# Patient Record
Sex: MALE | Race: OTHER | NOT HISPANIC OR LATINO
[De-identification: names, ages, dates, MRNs, and addresses within clinical notes are randomized per-mention and may not be internally consistent; named-entity substitution may affect disease eponyms.]

---

## 2021-08-20 ENCOUNTER — APPOINTMENT (OUTPATIENT)
Dept: DERMATOLOGY | Facility: CLINIC | Age: 51
End: 2021-08-20

## 2021-09-14 ENCOUNTER — APPOINTMENT (OUTPATIENT)
Dept: PLASTIC SURGERY | Facility: CLINIC | Age: 51
End: 2021-09-14
Payer: COMMERCIAL

## 2021-09-14 PROCEDURE — 99203 OFFICE O/P NEW LOW 30 MIN: CPT

## 2021-09-14 NOTE — HISTORY OF PRESENT ILLNESS
[FreeTextEntry1] : 51 years old patient present in the office for\par Problem:  mass\par Location:  right clavicle area\par Duration:  about 6-7 years\par Seen by Dermatology:  yes\par No previous treatments\par Any itching: no          bleeding: no                Drainage: no\par Imaging/Biopsy: \par increasing in size, no change in color \par Infection or inflammation: no\par Pain or discomfort: no\par Personal history of skin cancer: no\par Family history of skin cancer: yes, mother.\par

## 2021-09-30 ENCOUNTER — APPOINTMENT (OUTPATIENT)
Dept: DERMATOLOGY | Facility: CLINIC | Age: 51
End: 2021-09-30

## 2021-10-05 ENCOUNTER — LABORATORY RESULT (OUTPATIENT)
Age: 51
End: 2021-10-05

## 2021-10-05 ENCOUNTER — APPOINTMENT (OUTPATIENT)
Dept: PLASTIC SURGERY | Facility: CLINIC | Age: 51
End: 2021-10-05
Payer: COMMERCIAL

## 2021-10-05 PROCEDURE — 13100 CMPLX RPR TRUNK 1.1-2.5 CM: CPT | Mod: 58,59

## 2021-10-05 PROCEDURE — 21555 EXC NECK LES SC < 3 CM: CPT

## 2021-10-05 NOTE — PROCEDURE
[FreeTextEntry6] : Preopdx: right clavicular mass\par Procedure: excisional biopsy  2.1 cm, and complex closure 2.1 cm right  chest\par Anesthesia: local 1% w/epi\par Specimens: to path on formalin\par No complications\par \par Summary:\par IC obtained.  Lesion demarcated with marking pen.  1%lido with epinephrine injected.  15 blade used to incise full thickness.    2.1Cm  lesion excised in subcutaneous plane.   Hemostasis obtained with cautery.  Skin edges widely undermined and closed for a complex closure of  2.1 cm.  bacitracin and steristrips placed.  \par \par

## 2021-10-14 ENCOUNTER — APPOINTMENT (OUTPATIENT)
Dept: PLASTIC SURGERY | Facility: CLINIC | Age: 51
End: 2021-10-14
Payer: COMMERCIAL

## 2021-10-14 PROCEDURE — 99024 POSTOP FOLLOW-UP VISIT: CPT

## 2021-10-14 NOTE — HISTORY OF PRESENT ILLNESS
[FreeTextEntry1] : DOP - 10/5/21\par S/P - Excision of Right clavicular area mass. \par path - Follicular cyst\par  No excessive bleeding. No fevers. No odor. No purulent discharge. No excessive pain.\par

## 2021-11-12 ENCOUNTER — APPOINTMENT (OUTPATIENT)
Dept: PLASTIC SURGERY | Facility: CLINIC | Age: 51
End: 2021-11-12
Payer: COMMERCIAL

## 2021-11-12 DIAGNOSIS — L72.0 EPIDERMAL CYST: ICD-10-CM

## 2021-11-12 PROCEDURE — 99024 POSTOP FOLLOW-UP VISIT: CPT

## 2021-11-12 NOTE — HISTORY OF PRESENT ILLNESS
[FreeTextEntry1] : Status post excision and biopsy of follicular cyst of right clavicular area October 5, 2021\par site is well-healed and there is a small pink scar\par no complaints

## 2023-04-28 ENCOUNTER — APPOINTMENT (OUTPATIENT)
Dept: ORTHOPEDIC SURGERY | Facility: CLINIC | Age: 53
End: 2023-04-28
Payer: COMMERCIAL

## 2023-04-28 VITALS
BODY MASS INDEX: 26.28 KG/M2 | TEMPERATURE: 97.2 F | DIASTOLIC BLOOD PRESSURE: 80 MMHG | SYSTOLIC BLOOD PRESSURE: 124 MMHG | WEIGHT: 194 LBS | OXYGEN SATURATION: 94 % | HEIGHT: 72 IN | HEART RATE: 69 BPM

## 2023-04-28 DIAGNOSIS — M79.661 PAIN IN RIGHT LOWER LEG: ICD-10-CM

## 2023-04-28 DIAGNOSIS — S83.271A COMPLEX TEAR OF LATERAL MENISCUS, CURRENT INJURY, RIGHT KNEE, INITIAL ENCOUNTER: ICD-10-CM

## 2023-04-28 PROCEDURE — 99204 OFFICE O/P NEW MOD 45 MIN: CPT

## 2023-04-28 PROCEDURE — 73562 X-RAY EXAM OF KNEE 3: CPT | Mod: 50

## 2023-05-07 ENCOUNTER — OUTPATIENT (OUTPATIENT)
Dept: OUTPATIENT SERVICES | Facility: HOSPITAL | Age: 53
LOS: 1 days | End: 2023-05-07
Payer: COMMERCIAL

## 2023-05-07 ENCOUNTER — EMERGENCY (EMERGENCY)
Facility: HOSPITAL | Age: 53
LOS: 1 days | Discharge: HOME | End: 2023-05-07
Attending: EMERGENCY MEDICINE | Admitting: EMERGENCY MEDICINE
Payer: COMMERCIAL

## 2023-05-07 ENCOUNTER — APPOINTMENT (OUTPATIENT)
Dept: MRI IMAGING | Facility: CLINIC | Age: 53
End: 2023-05-07
Payer: COMMERCIAL

## 2023-05-07 VITALS
DIASTOLIC BLOOD PRESSURE: 77 MMHG | HEART RATE: 85 BPM | SYSTOLIC BLOOD PRESSURE: 136 MMHG | TEMPERATURE: 98 F | OXYGEN SATURATION: 100 % | RESPIRATION RATE: 16 BRPM

## 2023-05-07 DIAGNOSIS — S83.271A COMPLEX TEAR OF LATERAL MENISCUS, CURRENT INJURY, RIGHT KNEE, INITIAL ENCOUNTER: ICD-10-CM

## 2023-05-07 PROCEDURE — 99283 EMERGENCY DEPT VISIT LOW MDM: CPT

## 2023-05-07 PROCEDURE — 73721 MRI JNT OF LWR EXTRE W/O DYE: CPT | Mod: 26,RT

## 2023-05-07 PROCEDURE — 73721 MRI JNT OF LWR EXTRE W/O DYE: CPT

## 2023-05-07 RX ORDER — ACETAMINOPHEN 500 MG
975 TABLET ORAL ONCE
Refills: 0 | Status: COMPLETED | OUTPATIENT
Start: 2023-05-07 | End: 2023-05-07

## 2023-05-07 RX ADMIN — Medication 975 MILLIGRAM(S): at 13:41

## 2023-05-07 NOTE — ED PROVIDER NOTE - ATTENDING CONTRIBUTION TO CARE
52 year old with isolated shoulder pain. no chest pain sob or fever. concern for msk pain. no vascular compromise suspected based on exam. patient has appointment with orthopedic surgeon in 1 day. will refer to surgeon for workup as outpatient

## 2023-05-07 NOTE — ED PROVIDER NOTE - CLINICAL SUMMARY MEDICAL DECISION MAKING FREE TEXT BOX
52-year-old male presenting to emergency department with 3 to 4 days of right shoulder pain sustained while working out at the gym.  Pain was gradual onset after working out.  Has been taking Aleve, ibuprofen, meloxicam for symptoms without relief.  Has appointment with orthopedic surgeon tomorrow.  Came to emergency department to see if he can get an MRI.  Informed patient that we would not be able to get MRI emergently in the emergency department today and that he should follow-up with his orthopedic surgeon tomorrow.  Patient vital stable.  On exam patient has no bony tenderness to right shoulder with full range of motion and good strength.  Neurovascularly intact.  Does have tenderness to lateral shoulder over deltoid muscle.  Likely muscle strain will give acetaminophen in emergency department and advised to continue taking just one of the NSAIDs that he is taking.  X-ray not indicated at this time and patient can have x-ray tomorrow at orthopedic surgery office.  Patient in agreement with plan, answered all questions, gave return precautions.  Will discharge home after patient gets his acetaminophen.

## 2023-05-07 NOTE — ED PROVIDER NOTE - OBJECTIVE STATEMENT
52-year-old male no significant past medical history presenting to emergency department with right shoulder pain x3 to 4 days.  Patient states he has recently been increasing the amount of arm work he does in the gym and noticed the pain after working out earlier this week.  Pain was gradual onset.  Has been taking Aleve for symptoms without relief.  Last dose of Aleve was this morning at 9 AM.  No prior injuries to the shoulder.  Patient is right-hand dominant.  Has pain with range of motion of arm.  No fever, numbness or tingling.  No neck pain.

## 2023-05-07 NOTE — ED PROVIDER NOTE - PHYSICAL EXAMINATION
Gen: WDWN, NAD  HEENT: EOMI, no nasal discharge, mucous membranes moist  CV: RRR, +S1/S2, no M/R/G, 2+ radial pulses b/l  Resp: CTAB, no W/R/R, no accessory muscle use, no increased work of breathing  GI: Abdomen soft non-distended, NTTP  MSK: Rt shoulder: No obvious deformity, no skin changes. No clavicular or bony tenderness to palpation. +Tenderness over deltoid muscle. Full ROM with extension, flexion and abduction. Negative empty can. No pain with cross arm test. No TTP to C-spine, full ROM at neck. No TTP with full ROM of Rt elbow.   Neuro: A&Ox4, following commands, moving all four extremities spontaneously  Psych: appropriate mood

## 2023-05-07 NOTE — ED ADULT TRIAGE NOTE - CHIEF COMPLAINT QUOTE
Patient presenting with R arm pain x 4 days after exercising at the gym. Full ROM to R arm. Denies PMH.

## 2023-05-07 NOTE — ED PROVIDER NOTE - PATIENT PORTAL LINK FT
You can access the FollowMyHealth Patient Portal offered by [facility] by registering at the following website: [email or web address]. By joining [employer]’s FollowMyHealth portal, you will also be able to view your health information using other applications (apps) compatible with our system.

## 2023-05-07 NOTE — ED ADULT NURSE NOTE - OBJECTIVE STATEMENT
received pt in intake room 10B, 52 yr/o male A+OX4, ambulatory at baseline. PMH of sinus surgery. pt presented to the ED C/O right arm and shoulder pain for 4 days. denies numbness and tingling in extremity. pt has active and passive ROM in extremity. VSS, denies chest pain and SOB, RR even and unlabored. pending MD orders.

## 2023-05-07 NOTE — ED PROVIDER NOTE - NSFOLLOWUPINSTRUCTIONS_ED_ALL_ED_FT
Please keep your appointment with the orthopedic surgeon for tomorrow.     We evaluated you in the emergency room and it appears that you have a muscle strain.     We recommend that you rest, ice and take tylenol(650 mg up to three times a day)/motrin(600 mg up to three times a day) for your symptoms.     After 48 hours please use heat on the area that is hurting.     If you still have persistent pain after 5-7 days after the injury please be re-evaluated as there could be a more severe diagnosis than just a strain. If you develop numbness, weakness, change in color of your extremities (turn blue or white), worsening pain please seek immediate medical care for repeat evaluation.

## 2023-05-08 ENCOUNTER — APPOINTMENT (OUTPATIENT)
Dept: ORTHOPEDIC SURGERY | Facility: CLINIC | Age: 53
End: 2023-05-08
Payer: COMMERCIAL

## 2023-05-08 VITALS
BODY MASS INDEX: 26.01 KG/M2 | DIASTOLIC BLOOD PRESSURE: 82 MMHG | SYSTOLIC BLOOD PRESSURE: 128 MMHG | OXYGEN SATURATION: 98 % | HEIGHT: 72 IN | HEART RATE: 67 BPM | TEMPERATURE: 97.2 F | WEIGHT: 192 LBS

## 2023-05-08 DIAGNOSIS — M25.511 PAIN IN RIGHT SHOULDER: ICD-10-CM

## 2023-05-08 PROCEDURE — 73030 X-RAY EXAM OF SHOULDER: CPT | Mod: RT

## 2023-05-08 PROCEDURE — 99214 OFFICE O/P EST MOD 30 MIN: CPT

## 2023-05-08 NOTE — HISTORY OF PRESENT ILLNESS
[de-identified] : 52 year old RHD male presents for initial evaluation of right shoulder pain x 4-5 days. Denies particular trauma or injury. He thinks this may be due to overuse from weightlifting. \par He complains of localized sharp pain worse with any movements of the arm and sleeping on the right side. He has tried several OTC medications as well as Mobic with some relief. Denies neck pain. Denies paresthesias. Denies prior injuries.

## 2023-05-08 NOTE — DISCUSSION/SUMMARY
[de-identified] : The patient has tendinitis of the right shoulder.  I have discussed the pathology, natural history and treatment options with him.  He will take a daily course of Mobic.  Medication risks have been reviewed.  He will modify his activities.  If unimproved after 10 days he can be reevaluated.

## 2023-05-08 NOTE — PHYSICAL EXAM
[Rad] : radial 2+ and symmetric bilaterally [Normal] : Alert and in no acute distress [Poor Appearance] : well-appearing [Acute Distress] : not in acute distress [Obese] : not obese [de-identified] : The patient has no respiratory distress. Mood and affect are normal. The patient is alert and oriented to person, place and time.\par Examination of the cervical spine demonstrates no tenderness, no deformity and no muscle spasm. Cervical spine rotation is 60° to the right, 60° to the left, 75° of extension and 45° of flexion. Neurologic exam of the upper extremities reveals intact sensation to light touch. Motor function is 5 over 5 in all groups. Deep tendon reflexes are 2+ and equal at the biceps, triceps and brachioradialis.\par Examination of the right shoulder demonstrates no deformity. The skin is intact. There is no erythema. There is tenderness anteriorly. Impingement sign is positive There is no instability. Drop arm test is negative. Empty can test is negative. Liftoff test is negative. [place] test is negative.  He has elevation of 140 degrees, external rotation of 50 degrees and internal rotation to the upper lumbar level bilaterally.  The elbows are stable.  There is no lymphedema. [de-identified] : AP, transscapular and axillary x-rays of the right shoulder demonstrate no fracture, no dislocation and no bony abnormality.